# Patient Record
Sex: FEMALE | Race: AMERICAN INDIAN OR ALASKA NATIVE | NOT HISPANIC OR LATINO | URBAN - METROPOLITAN AREA
[De-identification: names, ages, dates, MRNs, and addresses within clinical notes are randomized per-mention and may not be internally consistent; named-entity substitution may affect disease eponyms.]

---

## 2017-01-17 ENCOUNTER — EMERGENCY (EMERGENCY)
Facility: HOSPITAL | Age: 52
LOS: 0 days | Discharge: HOME | End: 2017-01-17

## 2017-06-27 DIAGNOSIS — Z02.9 ENCOUNTER FOR ADMINISTRATIVE EXAMINATIONS, UNSPECIFIED: ICD-10-CM

## 2021-08-11 ENCOUNTER — INPATIENT (INPATIENT)
Facility: HOSPITAL | Age: 56
LOS: 2 days | Discharge: HOME | End: 2021-08-14
Attending: SURGERY | Admitting: SURGERY
Payer: MEDICAID

## 2021-08-11 VITALS
RESPIRATION RATE: 19 BRPM | DIASTOLIC BLOOD PRESSURE: 82 MMHG | HEART RATE: 89 BPM | OXYGEN SATURATION: 98 % | SYSTOLIC BLOOD PRESSURE: 131 MMHG | WEIGHT: 115.08 LBS | TEMPERATURE: 100 F

## 2021-08-11 LAB
ALBUMIN SERPL ELPH-MCNC: 4.3 G/DL — SIGNIFICANT CHANGE UP (ref 3.5–5.2)
ALP SERPL-CCNC: 114 U/L — SIGNIFICANT CHANGE UP (ref 30–115)
ALT FLD-CCNC: 18 U/L — SIGNIFICANT CHANGE UP (ref 0–41)
ANION GAP SERPL CALC-SCNC: 12 MMOL/L — SIGNIFICANT CHANGE UP (ref 7–14)
AST SERPL-CCNC: 19 U/L — SIGNIFICANT CHANGE UP (ref 0–41)
BASOPHILS # BLD AUTO: 0.05 K/UL — SIGNIFICANT CHANGE UP (ref 0–0.2)
BASOPHILS NFR BLD AUTO: 0.4 % — SIGNIFICANT CHANGE UP (ref 0–1)
BILIRUB SERPL-MCNC: 0.6 MG/DL — SIGNIFICANT CHANGE UP (ref 0.2–1.2)
BUN SERPL-MCNC: 11 MG/DL — SIGNIFICANT CHANGE UP (ref 10–20)
CALCIUM SERPL-MCNC: 9.5 MG/DL — SIGNIFICANT CHANGE UP (ref 8.5–10.1)
CHLORIDE SERPL-SCNC: 103 MMOL/L — SIGNIFICANT CHANGE UP (ref 98–110)
CO2 SERPL-SCNC: 26 MMOL/L — SIGNIFICANT CHANGE UP (ref 17–32)
CREAT SERPL-MCNC: 0.7 MG/DL — SIGNIFICANT CHANGE UP (ref 0.7–1.5)
EOSINOPHIL # BLD AUTO: 0.14 K/UL — SIGNIFICANT CHANGE UP (ref 0–0.7)
EOSINOPHIL NFR BLD AUTO: 1.2 % — SIGNIFICANT CHANGE UP (ref 0–8)
GLUCOSE SERPL-MCNC: 99 MG/DL — SIGNIFICANT CHANGE UP (ref 70–99)
HCT VFR BLD CALC: 40.3 % — SIGNIFICANT CHANGE UP (ref 37–47)
HGB BLD-MCNC: 13.2 G/DL — SIGNIFICANT CHANGE UP (ref 12–16)
IMM GRANULOCYTES NFR BLD AUTO: 0.4 % — HIGH (ref 0.1–0.3)
LIDOCAIN IGE QN: 22 U/L — SIGNIFICANT CHANGE UP (ref 7–60)
LYMPHOCYTES # BLD AUTO: 1.57 K/UL — SIGNIFICANT CHANGE UP (ref 1.2–3.4)
LYMPHOCYTES # BLD AUTO: 13.8 % — LOW (ref 20.5–51.1)
MCHC RBC-ENTMCNC: 28.3 PG — SIGNIFICANT CHANGE UP (ref 27–31)
MCHC RBC-ENTMCNC: 32.8 G/DL — SIGNIFICANT CHANGE UP (ref 32–37)
MCV RBC AUTO: 86.5 FL — SIGNIFICANT CHANGE UP (ref 81–99)
MONOCYTES # BLD AUTO: 0.97 K/UL — HIGH (ref 0.1–0.6)
MONOCYTES NFR BLD AUTO: 8.5 % — SIGNIFICANT CHANGE UP (ref 1.7–9.3)
NEUTROPHILS # BLD AUTO: 8.62 K/UL — HIGH (ref 1.4–6.5)
NEUTROPHILS NFR BLD AUTO: 75.7 % — HIGH (ref 42.2–75.2)
NRBC # BLD: 0 /100 WBCS — SIGNIFICANT CHANGE UP (ref 0–0)
PLATELET # BLD AUTO: 313 K/UL — SIGNIFICANT CHANGE UP (ref 130–400)
POTASSIUM SERPL-MCNC: 4.5 MMOL/L — SIGNIFICANT CHANGE UP (ref 3.5–5)
POTASSIUM SERPL-SCNC: 4.5 MMOL/L — SIGNIFICANT CHANGE UP (ref 3.5–5)
PROT SERPL-MCNC: 6.8 G/DL — SIGNIFICANT CHANGE UP (ref 6–8)
RBC # BLD: 4.66 M/UL — SIGNIFICANT CHANGE UP (ref 4.2–5.4)
RBC # FLD: 12.2 % — SIGNIFICANT CHANGE UP (ref 11.5–14.5)
SARS-COV-2 RNA SPEC QL NAA+PROBE: SIGNIFICANT CHANGE UP
SODIUM SERPL-SCNC: 141 MMOL/L — SIGNIFICANT CHANGE UP (ref 135–146)
TROPONIN T SERPL-MCNC: <0.01 NG/ML — SIGNIFICANT CHANGE UP
WBC # BLD: 11.39 K/UL — HIGH (ref 4.8–10.8)
WBC # FLD AUTO: 11.39 K/UL — HIGH (ref 4.8–10.8)

## 2021-08-11 PROCEDURE — 74177 CT ABD & PELVIS W/CONTRAST: CPT | Mod: 26,MA

## 2021-08-11 PROCEDURE — 99285 EMERGENCY DEPT VISIT HI MDM: CPT

## 2021-08-11 PROCEDURE — 99284 EMERGENCY DEPT VISIT MOD MDM: CPT

## 2021-08-11 PROCEDURE — 71045 X-RAY EXAM CHEST 1 VIEW: CPT | Mod: 26

## 2021-08-11 PROCEDURE — 93010 ELECTROCARDIOGRAM REPORT: CPT

## 2021-08-11 RX ORDER — CEFOTETAN DISODIUM 1 G
1000 VIAL (EA) INJECTION ONCE
Refills: 0 | Status: DISCONTINUED | OUTPATIENT
Start: 2021-08-11 | End: 2021-08-12

## 2021-08-11 RX ORDER — ONDANSETRON 8 MG/1
4 TABLET, FILM COATED ORAL ONCE
Refills: 0 | Status: COMPLETED | OUTPATIENT
Start: 2021-08-11 | End: 2021-08-11

## 2021-08-11 RX ORDER — FAMOTIDINE 10 MG/ML
20 INJECTION INTRAVENOUS ONCE
Refills: 0 | Status: COMPLETED | OUTPATIENT
Start: 2021-08-11 | End: 2021-08-11

## 2021-08-11 RX ORDER — SODIUM CHLORIDE 9 MG/ML
1000 INJECTION INTRAMUSCULAR; INTRAVENOUS; SUBCUTANEOUS ONCE
Refills: 0 | Status: COMPLETED | OUTPATIENT
Start: 2021-08-11 | End: 2021-08-11

## 2021-08-11 RX ORDER — MORPHINE SULFATE 50 MG/1
4 CAPSULE, EXTENDED RELEASE ORAL ONCE
Refills: 0 | Status: DISCONTINUED | OUTPATIENT
Start: 2021-08-11 | End: 2021-08-11

## 2021-08-11 RX ADMIN — ONDANSETRON 104 MILLIGRAM(S): 8 TABLET, FILM COATED ORAL at 20:49

## 2021-08-11 RX ADMIN — FAMOTIDINE 20 MILLIGRAM(S): 10 INJECTION INTRAVENOUS at 22:00

## 2021-08-11 RX ADMIN — MORPHINE SULFATE 4 MILLIGRAM(S): 50 CAPSULE, EXTENDED RELEASE ORAL at 20:50

## 2021-08-11 RX ADMIN — FAMOTIDINE 100 MILLIGRAM(S): 10 INJECTION INTRAVENOUS at 20:50

## 2021-08-11 RX ADMIN — SODIUM CHLORIDE 1000 MILLILITER(S): 9 INJECTION INTRAMUSCULAR; INTRAVENOUS; SUBCUTANEOUS at 20:50

## 2021-08-11 NOTE — ED PROVIDER NOTE - PHYSICAL EXAMINATION
--EXAM--  VITAL SIGNS: I have reviewed vs documented at present.  CONSTITUTIONAL: Well-developed; well-nourished; in no acute distress.   SKIN: Warm and dry, no acute rash.     NECK: Supple; non tender.  CARD: S1, S2, Regular rate and rhythm.   RESP: No wheezes, rales or rhonchi.  ABD: Normal bowel sounds; soft; non-distended; tender epigastric   EXT: Normal ROM.   NEURO: Alert, oriented, grossly unremarkable. Strength 5/5 in all extremities. Sensation intact throughout.  PSYCH: Cooperative, appropriate.

## 2021-08-11 NOTE — H&P ADULT - NSHPPHYSICALEXAM_GEN_ALL_CORE
Vital Signs Last 24 Hrs  T(C): 37.2 (11 Aug 2021 20:52), Max: 37.5 (11 Aug 2021 19:46)  T(F): 99 (11 Aug 2021 20:52), Max: 99.5 (11 Aug 2021 19:46)  HR: 89 (11 Aug 2021 19:46) (89 - 89)  BP: 131/82 (11 Aug 2021 19:46) (131/82 - 131/82)  BP(mean): --  RR: 19 (11 Aug 2021 19:46) (19 - 19)  SpO2: 98% (11 Aug 2021 19:46) (98% - 98%)      PHYSICAL EXAM:      Constitutional: NAD, A&O x3    Eyes: PERRLA, no conjuctivitis    Neck: no lymphadenopathy    Respiratory: +air entry, no rales, no rhonchi, no wheezes    Cardiovascular: +S1 and S2, regular rate and rhythm    Gastrointestinal: +BS, soft, (+) TTP RLQ>LLQ, no reboud, not distended    Extremities:  no edema, no calf tenderness    Neurological: sensation intact, ROM equal B/L, CN II-XII intact    Skin: no rashes, normal turgor

## 2021-08-11 NOTE — H&P ADULT - HISTORY OF PRESENT ILLNESS
54 y/o female with PMH of HTN, HLD, GERD presents to the ED with c/o 3 day hx abdominal pain associated with nausea/vomiting and chills. Pt states, pain started at the epigastrium but then radiated to the lower abdomen right>left. Pt sister at bedside states, pt also c/o chest pressure when pt was nauseous. Denies SOB, cough and palpitations.

## 2021-08-11 NOTE — H&P ADULT - ATTENDING COMMENTS
This is a 56yo woman with a history of HLD, HTN, and chronic back pain due to herniated discs. She began to experience vague abdominal pain 4d PTA. Pain was diffuse, but mostly in the L upper abdomen. It was initially a/w nausea and some emesis; she denies fevers, chills. There was no change in bowel habits. She denies dysuria or a history of gynecologic problems. She took tylenol which did not help much with this pain. Pain did migrate to the bilateral lower quadrants over the past 24h; she does state R>L. The pt has not had such pain before, though she does have a history of back pain as mentioned previously. In the ER, she was afebrile and HD stable. The abdomen is mildly tender, without peritoneal findings. There is tenderness in all four quadrants. Rovsing sign is negative. Labs show initial WBC ct 11.39 which normalized as of this morning with IV abx. CT was done and shows no bowel pathology; there is air within the lumen of the proximal and midappendix, and perhaps very mild thickening of the distal appendix where air is absent. There is no fecalith. There is minimal to no stranding of the mesenteric fat surrounding the appendix.    As of this morning, pt states she feels somewhat better than last night - though pain to some degree persists. Exam is as of the above.    We will plan for inpatient admission; serial exams; IV abx; keep NPO for now. Nevertheless, the history is not supportive of acute appendicitis (distribution, pattern of pain, age category) and CT findings do appear somewhat ambiguous to me - I would expect more inflammatory changes after 4d of such symptoms. For this reason, I would not advise surgery at the moment. We will monitor the patient, and I have advised her that most patients in the absence of a fecalith, even if appendicitis is present, will improve with antibiotic therapy alone and that 75% of such patients will have no appendiceal problems in the future according to current surgical literature. I have also let her and her family know that if she fails to improve, the risks, benefits, and alternatives to lap appendectomy will be something to discuss. She understands the above. This is a 54yo woman with a history of HLD, HTN, and chronic back pain due to herniated discs. She began to experience vague abdominal pain 4d PTA. Pain was diffuse, but mostly in the L upper abdomen. It was initially a/w nausea and some emesis; she denies fevers, chills. There was no change in bowel habits. She denies dysuria or a history of gynecologic problems. She took tylenol which did not help much with this pain. Pain did migrate to the bilateral lower quadrants over the past 24h; she does state R>L. The pt has not had such pain before, though she does have a history of back pain as mentioned previously. In the ER, she was afebrile and HD stable. The abdomen is mildly tender, without peritoneal findings. There is tenderness in all four quadrants. Rovsing sign is negative. Labs show initial WBC ct 11.39 which normalized as of this morning with IV abx. CT was done and shows no bowel pathology; there is air within the lumen of the proximal and midappendix, and perhaps very mild thickening of the distal appendix where air is absent. There is no fecalith. There is minimal to no stranding of the mesenteric fat surrounding the appendix.    As of this morning, pt states she feels somewhat better than last night - though pain to some degree persists. Exam is as of the above.    The history is not supportive of acute appendicitis (distribution, pattern of pain, age category) and CT findings do appear somewhat ambiguous to me - I would expect more inflammatory changes in the right lower quadrant after 4d of symptoms in the setting of true appendicitis. For this reason, I would not advise surgery at the moment. We will monitor the patient, and I have advised her that most patients in the absence of a fecalith, even if appendicitis is present, will improve with antibiotic therapy alone and that 75% of such patients will have no appendiceal problems in the future according to current surgical literature. We will plan for inpatient admission; serial exams; IV abx; keep NPO. I have let the patient and her family know that if she fails to improve, the risks, benefits, and alternatives to lap appendectomy will be something to discuss given that there is no other pathology identified on CT. She understands the above.

## 2021-08-11 NOTE — ED PROVIDER NOTE - PROGRESS NOTE DETAILS
spoke to radiologist  he is reading tip appendicitis spoke to surgical pa will see patient pt reexamined -  ttp RLQ,  LLQ  ( as well as  LUQ and epigastrum - no rebound guarding or rigidity )

## 2021-08-11 NOTE — H&P ADULT - ASSESSMENT
56 y/o female with PMH of HTN, HLD, GERD admitted for appendicitis.     A/P:  Discussed with Dr. Garcia   -NPO  -Pain mgmt  -IV hydration  -AM labs  HTN  -On losartan faily controlled   HLD   - ON simvastatin   -GI and DVT prophylaxis

## 2021-08-11 NOTE — ED PROVIDER NOTE - NS ED ROS FT
Review of Systems:  	•	CONSTITUTIONAL - no fever, no diaphoresis, no chills  	•	SKIN - no rash  	•	HEMATOLOGIC - no bleeding, no bruising  	•	EYES - no eye pain, no blurry vision  	•	ENT - no change in hearing, no sore throat, no ear pain or tinnitus  	•	RESPIRATORY - no shortness of breath, no cough  	•	CARDIAC - no chest pain, no palpitations  	•	GI - abd pain, no nausea, no vomiting, no diarrhea, no constipation  	•	GENITO-URINARY - no discharge, no dysuria; no hematuria, no increased urinary frequency  	•	MUSCULOSKELETAL - no joint paint, no swelling, no redness

## 2021-08-11 NOTE — ED PROVIDER NOTE - CLINICAL SUMMARY MEDICAL DECISION MAKING FREE TEXT BOX
55yF pmhx  htn  joint pain on gabapentin pw 4 days  upper  abdominal pain  2 days of nausea  felt weak  today . on exam  pt  generalized ttp - labs reviewed wnl    CT with tip appendicitis -   abx given  dw surgery - admitted

## 2021-08-11 NOTE — ED PROVIDER NOTE - OBJECTIVE STATEMENT
this is 56 yo female presents to ed for evaluation of abdominal pain. patient states pain has been there for few days but today it is worse

## 2021-08-12 DIAGNOSIS — K35.80 UNSPECIFIED ACUTE APPENDICITIS: ICD-10-CM

## 2021-08-12 LAB
ANION GAP SERPL CALC-SCNC: 9 MMOL/L — SIGNIFICANT CHANGE UP (ref 7–14)
APTT BLD: 33.9 SEC — SIGNIFICANT CHANGE UP (ref 27–39.2)
BUN SERPL-MCNC: 8 MG/DL — LOW (ref 10–20)
CALCIUM SERPL-MCNC: 8.8 MG/DL — SIGNIFICANT CHANGE UP (ref 8.5–10.1)
CHLORIDE SERPL-SCNC: 108 MMOL/L — SIGNIFICANT CHANGE UP (ref 98–110)
CO2 SERPL-SCNC: 24 MMOL/L — SIGNIFICANT CHANGE UP (ref 17–32)
CREAT SERPL-MCNC: 0.6 MG/DL — LOW (ref 0.7–1.5)
GLUCOSE SERPL-MCNC: 101 MG/DL — HIGH (ref 70–99)
HCT VFR BLD CALC: 40 % — SIGNIFICANT CHANGE UP (ref 37–47)
HCV AB S/CO SERPL IA: 0.03 COI — SIGNIFICANT CHANGE UP
HCV AB SERPL-IMP: SIGNIFICANT CHANGE UP
HGB BLD-MCNC: 12.6 G/DL — SIGNIFICANT CHANGE UP (ref 12–16)
INR BLD: 1 RATIO — SIGNIFICANT CHANGE UP (ref 0.65–1.3)
MAGNESIUM SERPL-MCNC: 2.2 MG/DL — SIGNIFICANT CHANGE UP (ref 1.8–2.4)
MCHC RBC-ENTMCNC: 28.4 PG — SIGNIFICANT CHANGE UP (ref 27–31)
MCHC RBC-ENTMCNC: 31.5 G/DL — LOW (ref 32–37)
MCV RBC AUTO: 90.1 FL — SIGNIFICANT CHANGE UP (ref 81–99)
NRBC # BLD: 0 /100 WBCS — SIGNIFICANT CHANGE UP (ref 0–0)
PHOSPHATE SERPL-MCNC: 3.5 MG/DL — SIGNIFICANT CHANGE UP (ref 2.1–4.9)
PLATELET # BLD AUTO: 264 K/UL — SIGNIFICANT CHANGE UP (ref 130–400)
POTASSIUM SERPL-MCNC: 4.4 MMOL/L — SIGNIFICANT CHANGE UP (ref 3.5–5)
POTASSIUM SERPL-SCNC: 4.4 MMOL/L — SIGNIFICANT CHANGE UP (ref 3.5–5)
PROTHROM AB SERPL-ACNC: 11.5 SEC — SIGNIFICANT CHANGE UP (ref 9.95–12.87)
RBC # BLD: 4.44 M/UL — SIGNIFICANT CHANGE UP (ref 4.2–5.4)
RBC # FLD: 12.4 % — SIGNIFICANT CHANGE UP (ref 11.5–14.5)
SODIUM SERPL-SCNC: 141 MMOL/L — SIGNIFICANT CHANGE UP (ref 135–146)
WBC # BLD: 5.63 K/UL — SIGNIFICANT CHANGE UP (ref 4.8–10.8)
WBC # FLD AUTO: 5.63 K/UL — SIGNIFICANT CHANGE UP (ref 4.8–10.8)

## 2021-08-12 RX ORDER — MORPHINE SULFATE 50 MG/1
2 CAPSULE, EXTENDED RELEASE ORAL ONCE
Refills: 0 | Status: DISCONTINUED | OUTPATIENT
Start: 2021-08-12 | End: 2021-08-12

## 2021-08-12 RX ORDER — SIMVASTATIN 20 MG/1
20 TABLET, FILM COATED ORAL AT BEDTIME
Refills: 0 | Status: DISCONTINUED | OUTPATIENT
Start: 2021-08-12 | End: 2021-08-14

## 2021-08-12 RX ORDER — MORPHINE SULFATE 50 MG/1
2 CAPSULE, EXTENDED RELEASE ORAL EVERY 4 HOURS
Refills: 0 | Status: DISCONTINUED | OUTPATIENT
Start: 2021-08-12 | End: 2021-08-12

## 2021-08-12 RX ORDER — FAMOTIDINE 10 MG/ML
20 INJECTION INTRAVENOUS
Refills: 0 | Status: DISCONTINUED | OUTPATIENT
Start: 2021-08-12 | End: 2021-08-14

## 2021-08-12 RX ORDER — CEFOTETAN DISODIUM 1 G
1 VIAL (EA) INJECTION ONCE
Refills: 0 | Status: COMPLETED | OUTPATIENT
Start: 2021-08-12 | End: 2021-08-12

## 2021-08-12 RX ORDER — ONDANSETRON 8 MG/1
4 TABLET, FILM COATED ORAL EVERY 6 HOURS
Refills: 0 | Status: DISCONTINUED | OUTPATIENT
Start: 2021-08-12 | End: 2021-08-14

## 2021-08-12 RX ORDER — HEPARIN SODIUM 5000 [USP'U]/ML
5000 INJECTION INTRAVENOUS; SUBCUTANEOUS EVERY 12 HOURS
Refills: 0 | Status: DISCONTINUED | OUTPATIENT
Start: 2021-08-12 | End: 2021-08-14

## 2021-08-12 RX ORDER — METRONIDAZOLE 500 MG
500 TABLET ORAL EVERY 8 HOURS
Refills: 0 | Status: DISCONTINUED | OUTPATIENT
Start: 2021-08-12 | End: 2021-08-14

## 2021-08-12 RX ORDER — LOSARTAN POTASSIUM 100 MG/1
50 TABLET, FILM COATED ORAL DAILY
Refills: 0 | Status: DISCONTINUED | OUTPATIENT
Start: 2021-08-12 | End: 2021-08-14

## 2021-08-12 RX ORDER — SODIUM CHLORIDE 9 MG/ML
1000 INJECTION INTRAMUSCULAR; INTRAVENOUS; SUBCUTANEOUS
Refills: 0 | Status: DISCONTINUED | OUTPATIENT
Start: 2021-08-12 | End: 2021-08-13

## 2021-08-12 RX ORDER — CHLORHEXIDINE GLUCONATE 213 G/1000ML
1 SOLUTION TOPICAL
Refills: 0 | Status: DISCONTINUED | OUTPATIENT
Start: 2021-08-12 | End: 2021-08-14

## 2021-08-12 RX ORDER — CEFOTETAN DISODIUM 1 G
1 VIAL (EA) INJECTION EVERY 12 HOURS
Refills: 0 | Status: DISCONTINUED | OUTPATIENT
Start: 2021-08-12 | End: 2021-08-14

## 2021-08-12 RX ADMIN — MORPHINE SULFATE 2 MILLIGRAM(S): 50 CAPSULE, EXTENDED RELEASE ORAL at 23:59

## 2021-08-12 RX ADMIN — ONDANSETRON 4 MILLIGRAM(S): 8 TABLET, FILM COATED ORAL at 02:00

## 2021-08-12 RX ADMIN — HEPARIN SODIUM 5000 UNIT(S): 5000 INJECTION INTRAVENOUS; SUBCUTANEOUS at 18:41

## 2021-08-12 RX ADMIN — Medication 100 MILLIGRAM(S): at 22:09

## 2021-08-12 RX ADMIN — Medication 1 GRAM(S): at 00:00

## 2021-08-12 RX ADMIN — MORPHINE SULFATE 2 MILLIGRAM(S): 50 CAPSULE, EXTENDED RELEASE ORAL at 18:57

## 2021-08-12 RX ADMIN — Medication 100 MILLIGRAM(S): at 14:32

## 2021-08-12 RX ADMIN — FAMOTIDINE 20 MILLIGRAM(S): 10 INJECTION INTRAVENOUS at 18:37

## 2021-08-12 RX ADMIN — Medication 100 GRAM(S): at 00:27

## 2021-08-12 RX ADMIN — SODIUM CHLORIDE 1000 MILLILITER(S): 9 INJECTION INTRAMUSCULAR; INTRAVENOUS; SUBCUTANEOUS at 02:00

## 2021-08-12 RX ADMIN — Medication 100 GRAM(S): at 18:35

## 2021-08-12 RX ADMIN — SODIUM CHLORIDE 100 MILLILITER(S): 9 INJECTION INTRAMUSCULAR; INTRAVENOUS; SUBCUTANEOUS at 23:59

## 2021-08-12 RX ADMIN — SIMVASTATIN 20 MILLIGRAM(S): 20 TABLET, FILM COATED ORAL at 23:56

## 2021-08-12 RX ADMIN — MORPHINE SULFATE 4 MILLIGRAM(S): 50 CAPSULE, EXTENDED RELEASE ORAL at 03:21

## 2021-08-12 RX ADMIN — FAMOTIDINE 20 MILLIGRAM(S): 10 INJECTION INTRAVENOUS at 06:14

## 2021-08-12 RX ADMIN — HEPARIN SODIUM 5000 UNIT(S): 5000 INJECTION INTRAVENOUS; SUBCUTANEOUS at 06:14

## 2021-08-12 RX ADMIN — SODIUM CHLORIDE 100 MILLILITER(S): 9 INJECTION INTRAMUSCULAR; INTRAVENOUS; SUBCUTANEOUS at 15:00

## 2021-08-13 LAB
ANION GAP SERPL CALC-SCNC: 13 MMOL/L — SIGNIFICANT CHANGE UP (ref 7–14)
BLD GP AB SCN SERPL QL: SIGNIFICANT CHANGE UP
BUN SERPL-MCNC: 9 MG/DL — LOW (ref 10–20)
CALCIUM SERPL-MCNC: 9.1 MG/DL — SIGNIFICANT CHANGE UP (ref 8.5–10.1)
CHLORIDE SERPL-SCNC: 104 MMOL/L — SIGNIFICANT CHANGE UP (ref 98–110)
CO2 SERPL-SCNC: 25 MMOL/L — SIGNIFICANT CHANGE UP (ref 17–32)
CREAT SERPL-MCNC: 0.7 MG/DL — SIGNIFICANT CHANGE UP (ref 0.7–1.5)
GLUCOSE SERPL-MCNC: 75 MG/DL — SIGNIFICANT CHANGE UP (ref 70–99)
HCT VFR BLD CALC: 39.7 % — SIGNIFICANT CHANGE UP (ref 37–47)
HGB BLD-MCNC: 12.9 G/DL — SIGNIFICANT CHANGE UP (ref 12–16)
MCHC RBC-ENTMCNC: 28.5 PG — SIGNIFICANT CHANGE UP (ref 27–31)
MCHC RBC-ENTMCNC: 32.5 G/DL — SIGNIFICANT CHANGE UP (ref 32–37)
MCV RBC AUTO: 87.6 FL — SIGNIFICANT CHANGE UP (ref 81–99)
NRBC # BLD: 0 /100 WBCS — SIGNIFICANT CHANGE UP (ref 0–0)
PLATELET # BLD AUTO: 303 K/UL — SIGNIFICANT CHANGE UP (ref 130–400)
POTASSIUM SERPL-MCNC: 4.2 MMOL/L — SIGNIFICANT CHANGE UP (ref 3.5–5)
POTASSIUM SERPL-SCNC: 4.2 MMOL/L — SIGNIFICANT CHANGE UP (ref 3.5–5)
RBC # BLD: 4.53 M/UL — SIGNIFICANT CHANGE UP (ref 4.2–5.4)
RBC # FLD: 12 % — SIGNIFICANT CHANGE UP (ref 11.5–14.5)
SODIUM SERPL-SCNC: 142 MMOL/L — SIGNIFICANT CHANGE UP (ref 135–146)
WBC # BLD: 4.77 K/UL — LOW (ref 4.8–10.8)
WBC # FLD AUTO: 4.77 K/UL — LOW (ref 4.8–10.8)

## 2021-08-13 PROCEDURE — 99233 SBSQ HOSP IP/OBS HIGH 50: CPT

## 2021-08-13 RX ADMIN — LOSARTAN POTASSIUM 50 MILLIGRAM(S): 100 TABLET, FILM COATED ORAL at 05:50

## 2021-08-13 RX ADMIN — CHLORHEXIDINE GLUCONATE 1 APPLICATION(S): 213 SOLUTION TOPICAL at 05:50

## 2021-08-13 RX ADMIN — Medication 100 GRAM(S): at 17:32

## 2021-08-13 RX ADMIN — HEPARIN SODIUM 5000 UNIT(S): 5000 INJECTION INTRAVENOUS; SUBCUTANEOUS at 05:53

## 2021-08-13 RX ADMIN — Medication 100 GRAM(S): at 05:50

## 2021-08-13 RX ADMIN — Medication 100 MILLIGRAM(S): at 05:50

## 2021-08-13 RX ADMIN — Medication 100 MILLIGRAM(S): at 13:19

## 2021-08-13 RX ADMIN — FAMOTIDINE 20 MILLIGRAM(S): 10 INJECTION INTRAVENOUS at 17:31

## 2021-08-13 RX ADMIN — FAMOTIDINE 20 MILLIGRAM(S): 10 INJECTION INTRAVENOUS at 05:50

## 2021-08-13 RX ADMIN — SIMVASTATIN 20 MILLIGRAM(S): 20 TABLET, FILM COATED ORAL at 22:12

## 2021-08-13 RX ADMIN — Medication 100 MILLIGRAM(S): at 22:12

## 2021-08-13 NOTE — PROGRESS NOTE ADULT - ASSESSMENT
56 y/o female with PMH of HTN, HLD, GERD admitted for tip appendicitis on CT  conservative management     A/P:    -start full liq diet , if christiano then can give reg diet for dinner   -Pain mgmt  -IV hydration  -AM labs  -cw losartan faily controlled   -OOB   -poss d/c 8/14

## 2021-08-13 NOTE — PROGRESS NOTE ADULT - ATTENDING COMMENTS
The patient continues to improve. +appetite and minimal abdominal pain, 4/10 today and 6/10 yest. SHe has requried no pain medication since yest afternoon. She denies nausea or emesis and would like to try diet. She is requesting when she might go home.    Exam with minimal ttp and no peritoneal findings, in b/l lower quadrants.  WBC ct normal    A/P: This patient presented with abdominal pain and a hsitory that was atypical for acute appendicitis; with possible 'tip' appendicitis and no fecalith on admiission CT scan. She is responding to antibiotic therapy.    Plan to advance to [facility]; cont abx; cont serial abdomiinal exams. Possible discharge to home tomorrow.

## 2021-08-14 ENCOUNTER — TRANSCRIPTION ENCOUNTER (OUTPATIENT)
Age: 56
End: 2021-08-14

## 2021-08-14 VITALS
SYSTOLIC BLOOD PRESSURE: 119 MMHG | RESPIRATION RATE: 17 BRPM | DIASTOLIC BLOOD PRESSURE: 67 MMHG | TEMPERATURE: 98 F | HEART RATE: 70 BPM

## 2021-08-14 LAB
ANION GAP SERPL CALC-SCNC: 11 MMOL/L — SIGNIFICANT CHANGE UP (ref 7–14)
BUN SERPL-MCNC: 8 MG/DL — LOW (ref 10–20)
CALCIUM SERPL-MCNC: 9.6 MG/DL — SIGNIFICANT CHANGE UP (ref 8.5–10.1)
CHLORIDE SERPL-SCNC: 105 MMOL/L — SIGNIFICANT CHANGE UP (ref 98–110)
CO2 SERPL-SCNC: 28 MMOL/L — SIGNIFICANT CHANGE UP (ref 17–32)
COVID-19 SPIKE DOMAIN AB INTERP: POSITIVE
COVID-19 SPIKE DOMAIN ANTIBODY RESULT: >250 U/ML — HIGH
CREAT SERPL-MCNC: 0.8 MG/DL — SIGNIFICANT CHANGE UP (ref 0.7–1.5)
GLUCOSE SERPL-MCNC: 89 MG/DL — SIGNIFICANT CHANGE UP (ref 70–99)
HCT VFR BLD CALC: 42.3 % — SIGNIFICANT CHANGE UP (ref 37–47)
HGB BLD-MCNC: 13.9 G/DL — SIGNIFICANT CHANGE UP (ref 12–16)
MCHC RBC-ENTMCNC: 28.1 PG — SIGNIFICANT CHANGE UP (ref 27–31)
MCHC RBC-ENTMCNC: 32.9 G/DL — SIGNIFICANT CHANGE UP (ref 32–37)
MCV RBC AUTO: 85.6 FL — SIGNIFICANT CHANGE UP (ref 81–99)
NRBC # BLD: 0 /100 WBCS — SIGNIFICANT CHANGE UP (ref 0–0)
PLATELET # BLD AUTO: 384 K/UL — SIGNIFICANT CHANGE UP (ref 130–400)
POTASSIUM SERPL-MCNC: 4 MMOL/L — SIGNIFICANT CHANGE UP (ref 3.5–5)
POTASSIUM SERPL-SCNC: 4 MMOL/L — SIGNIFICANT CHANGE UP (ref 3.5–5)
RBC # BLD: 4.94 M/UL — SIGNIFICANT CHANGE UP (ref 4.2–5.4)
RBC # FLD: 11.9 % — SIGNIFICANT CHANGE UP (ref 11.5–14.5)
SARS-COV-2 IGG+IGM SERPL QL IA: >250 U/ML — HIGH
SARS-COV-2 IGG+IGM SERPL QL IA: POSITIVE
SODIUM SERPL-SCNC: 144 MMOL/L — SIGNIFICANT CHANGE UP (ref 135–146)
WBC # BLD: 4.05 K/UL — LOW (ref 4.8–10.8)
WBC # FLD AUTO: 4.05 K/UL — LOW (ref 4.8–10.8)

## 2021-08-14 PROCEDURE — 99232 SBSQ HOSP IP/OBS MODERATE 35: CPT

## 2021-08-14 RX ORDER — LOSARTAN POTASSIUM 100 MG/1
1 TABLET, FILM COATED ORAL
Qty: 0 | Refills: 0 | DISCHARGE

## 2021-08-14 RX ORDER — SIMVASTATIN 20 MG/1
1 TABLET, FILM COATED ORAL
Qty: 0 | Refills: 0 | DISCHARGE

## 2021-08-14 RX ADMIN — FAMOTIDINE 20 MILLIGRAM(S): 10 INJECTION INTRAVENOUS at 05:31

## 2021-08-14 RX ADMIN — HEPARIN SODIUM 5000 UNIT(S): 5000 INJECTION INTRAVENOUS; SUBCUTANEOUS at 05:31

## 2021-08-14 RX ADMIN — Medication 100 GRAM(S): at 05:31

## 2021-08-14 RX ADMIN — Medication 100 MILLIGRAM(S): at 05:31

## 2021-08-14 RX ADMIN — CHLORHEXIDINE GLUCONATE 1 APPLICATION(S): 213 SOLUTION TOPICAL at 05:31

## 2021-08-14 RX ADMIN — LOSARTAN POTASSIUM 50 MILLIGRAM(S): 100 TABLET, FILM COATED ORAL at 05:32

## 2021-08-14 NOTE — DISCHARGE NOTE PROVIDER - NSDCCPCAREPLAN_GEN_ALL_CORE_FT
PRINCIPAL DISCHARGE DIAGNOSIS  Diagnosis: Acute appendicitis  Assessment and Plan of Treatment:        PRINCIPAL DISCHARGE DIAGNOSIS  Diagnosis: Acute appendicitis  Assessment and Plan of Treatment: Prescription sent: Augmentin 875/125 once tab by mouth twice daily for 7 days.  Follow up with Dr. Bush Friday 8/20/21

## 2021-08-14 NOTE — DISCHARGE NOTE PROVIDER - CARE PROVIDER_API CALL
Shiv Bush  [address] [place] [address]  Phone: [phone]  Fax: (   )    -  Scheduled Appointment: 08/20/2021

## 2021-08-14 NOTE — DISCHARGE NOTE NURSING/CASE MANAGEMENT/SOCIAL WORK - PATIENT PORTAL LINK FT
You can access the FollowMyHealth Patient Portal offered by [facility] by registering at the following website: [email or web address]. By joining [employer]’s FollowMyHealth portal, you will also be able to view your health information using other applications (apps) compatible with our system.

## 2021-08-14 NOTE — DISCHARGE NOTE PROVIDER - PROVIDER TOKENS
FREE:[LAST:[Eleazar],FIRST:[Shiv],PHONE:[phone]],FAX:[phone]   [phone]    -],ADDRESS:[[address] [place] [address]],SCHEDULEDAPPT:[08/20/2021]]

## 2021-08-14 NOTE — PROGRESS NOTE ADULT - ATTENDING COMMENTS
Pt has improved and cont to do well. Toleratig diet with no residual pain or tenderness; afebrile. Plan for dc to home with 7d course PO abx and followup this Friday in office. Return precautions (fever, new or worsening abdominal pain, nausea, emesis) were provided.

## 2021-08-14 NOTE — DISCHARGE NOTE PROVIDER - NSDCMRMEDTOKEN_GEN_ALL_CORE_FT
losartan 50 mg oral tablet: 1 tab(s) orally once a day  Zocor 20 mg oral tablet: 1 tab(s) orally once a day (at bedtime)   Augmentin 875 mg-125 mg oral tablet: 1 tab(s) orally 2 times a day   losartan 50 mg oral tablet: 1 tab(s) orally once a day  Zocor 20 mg oral tablet: 1 tab(s) orally once a day (at bedtime)

## 2021-08-14 NOTE — PROGRESS NOTE ADULT - SUBJECTIVE AND OBJECTIVE BOX
Hospital day # 1    Pt seen and examined , feeling better   abd pain improved with no nausea or vomiting   pt wants to eat   denies sob/ cp / dysuria / chills / fever    PAST MEDICAL & SURGICAL HISTORY:  HTN,   hld  CBP    MEDICATIONS  (STANDING):  cefoTEtan  IVPB 1 Gram(s) IV Intermittent every 12 hours  chlorhexidine 4% Liquid 1 Application(s) Topical two times a day  famotidine Injectable 20 milliGRAM(s) IV Push two times a day  heparin   Injectable 5000 Unit(s) SubCutaneous every 12 hours  losartan 50 milliGRAM(s) Oral daily  metroNIDAZOLE  IVPB 500 milliGRAM(s) IV Intermittent every 8 hours  simvastatin 20 milliGRAM(s) Oral at bedtime      Vital Signs Last 24 Hrs  T(C): 35.9 (13 Aug 2021 14:00), Max: 36.5 (13 Aug 2021 05:15)  T(F): 96.6 (13 Aug 2021 14:00), Max: 97.7 (13 Aug 2021 05:15)  HR: 74 (13 Aug 2021 14:00) (65 - 74)  BP: 120/65 (13 Aug 2021 14:00) (107/60 - 127/71)  RR: 16 (13 Aug 2021 14:00) (16 - 16)    PE:  chest: cta b/l  cardio: s1s2 heard  abd: BS soft , min ttp in lower quad, no RT    08-13    142  |  104  |  9<L>  ----------------------------<  75  4.2   |  25  |  0.7    Ca    9.1      13 Aug 2021 07:26  Phos  3.5     08-12  Mg     2.2     08-12    TPro  6.8  /  Alb  4.3  /  TBili  0.6  /  DBili  x   /  AST  19  /  ALT  18  /  AlkPhos  114  08-11                            12.9   4.77  )-----------( 303      ( 13 Aug 2021 07:26 )             39.7     CAPILLARY BLOOD GLUCOSE        CARDIAC MARKERS ( 11 Aug 2021 21:00 )  x     / <0.01 ng/mL / x     / x     / x            
S; Pt doing well, tolerated regular diet. No N/V - had soft BM this AM, denies abdom pain  O; Vital Signs Last 24 Hrs  T(C): 36.7 (14 Aug 2021 05:15), Max: 36.7 (14 Aug 2021 05:15)  T(F): 98.1 (14 Aug 2021 05:15), Max: 98.1 (14 Aug 2021 05:15)  HR: 70 (14 Aug 2021 05:15) (66 - 74)  BP: 119/67 (14 Aug 2021 05:15) (119/67 - 121/68)  BP(mean): --  RR: 17 (14 Aug 2021 05:15) (16 - 18)  SpO2: --    MEDICATIONS  (STANDING):  cefoTEtan  IVPB 1 Gram(s) IV Intermittent every 12 hours  chlorhexidine 4% Liquid 1 Application(s) Topical two times a day  famotidine Injectable 20 milliGRAM(s) IV Push two times a day  heparin   Injectable 5000 Unit(s) SubCutaneous every 12 hours  losartan 50 milliGRAM(s) Oral daily  metroNIDAZOLE  IVPB 500 milliGRAM(s) IV Intermittent every 8 hours  simvastatin 20 milliGRAM(s) Oral at bedtime    MEDICATIONS  (PRN):  ondansetron Injectable 4 milliGRAM(s) IV Push every 6 hours PRN Nausea and/or Vomiting    EXAM:  lungs: cta  cvs: s1s2  abd: soft, NT/ND    labs: PENDING

## 2021-08-14 NOTE — DISCHARGE NOTE PROVIDER - HOSPITAL COURSE
HPI:  56 y/o female with PMH of HTN, HLD, GERD presents to the ED with c/o 3 day hx abdominal pain associated with nausea/vomiting and chills. Pt states, pain started at the epigastrium but then radiated to the lower abdomen right>left. Pt sister at bedside states, pt also c/o chest pressure when pt was nauseous. Denies SOB, cough and palpitations.  (11 Aug 2021 23:39)    - Pt was placed on cefotetan & flagyl   - WBC = 11-5--4  - remained afebrile and pain improved   - tolerated regular diet HPI:  54 y/o female with PMH of HTN, HLD, GERD presents to the ED with c/o 3 day hx abdominal pain associated with nausea/vomiting and chills. Pt states, pain started at the epigastrium but then radiated to the lower abdomen right>left. Pt sister at bedside states, pt also c/o chest pressure when pt was nauseous. Denies SOB, cough and palpitations.  (11 Aug 2021 23:39)    - Pt was placed on cefotetan & flagyl   - WBC = 11-5--4  - remained afebrile and pain improved   - tolerated regular diet    Augmentin 875/125 once tab by mouth twice daily for 7 days.  Follow up with Dr. Bush Friday 8/20/21

## 2021-08-14 NOTE — PROGRESS NOTE ADULT - ASSESSMENT
56 y/o female with PMH of HTN, HLD, GERD admitted for tip appendicitis on CT  conservative management     A/P:    - cont reg diet  - cont cefotetan & flagyl  -F/U labs  -cw losartan for HTN  - cont w/simvastatin for HLD  -likely  d/c today w/PO abx

## 2021-08-16 PROBLEM — I10 ESSENTIAL (PRIMARY) HYPERTENSION: Chronic | Status: ACTIVE | Noted: 2021-08-11

## 2021-08-16 PROBLEM — E78.5 HYPERLIPIDEMIA, UNSPECIFIED: Chronic | Status: ACTIVE | Noted: 2021-08-11

## 2021-08-17 LAB
CULTURE RESULTS: SIGNIFICANT CHANGE UP
CULTURE RESULTS: SIGNIFICANT CHANGE UP
SPECIMEN SOURCE: SIGNIFICANT CHANGE UP
SPECIMEN SOURCE: SIGNIFICANT CHANGE UP

## 2021-08-18 DIAGNOSIS — K35.80 UNSPECIFIED ACUTE APPENDICITIS: ICD-10-CM

## 2021-08-18 DIAGNOSIS — E78.5 HYPERLIPIDEMIA, UNSPECIFIED: ICD-10-CM

## 2021-08-18 DIAGNOSIS — K21.9 GASTRO-ESOPHAGEAL REFLUX DISEASE WITHOUT ESOPHAGITIS: ICD-10-CM

## 2021-08-18 DIAGNOSIS — G89.29 OTHER CHRONIC PAIN: ICD-10-CM

## 2021-08-18 DIAGNOSIS — I10 ESSENTIAL (PRIMARY) HYPERTENSION: ICD-10-CM

## 2021-08-18 DIAGNOSIS — M54.9 DORSALGIA, UNSPECIFIED: ICD-10-CM

## 2021-08-20 ENCOUNTER — APPOINTMENT (OUTPATIENT)
Age: 56
End: 2021-08-20
Payer: MEDICAID

## 2021-08-20 VITALS
BODY MASS INDEX: 21.53 KG/M2 | HEART RATE: 90 BPM | TEMPERATURE: 96.5 F | WEIGHT: 117 LBS | OXYGEN SATURATION: 98 % | HEIGHT: 62 IN | DIASTOLIC BLOOD PRESSURE: 78 MMHG | SYSTOLIC BLOOD PRESSURE: 118 MMHG

## 2021-08-20 DIAGNOSIS — Z78.9 OTHER SPECIFIED HEALTH STATUS: ICD-10-CM

## 2021-08-20 PROBLEM — Z00.00 ENCOUNTER FOR PREVENTIVE HEALTH EXAMINATION: Status: ACTIVE | Noted: 2021-08-20

## 2021-08-20 PROCEDURE — 99214 OFFICE O/P EST MOD 30 MIN: CPT

## 2021-08-24 PROBLEM — Z78.9 CAFFEINE USE: Status: ACTIVE | Noted: 2021-08-24

## 2021-08-24 NOTE — PHYSICAL EXAM
[Normal Thyroid] : the thyroid was normal [Normal Breath Sounds] : Normal breath sounds [Normal Heart Sounds] : normal heart sounds [Normal Rate and Rhythm] : normal rate and rhythm [Alert] : alert [Oriented to Person] : oriented to person [Oriented to Place] : oriented to place [Oriented to Time] : oriented to time [Calm] : calm [JVD] : no jugular venous distention  [Abdominal Masses] : No abdominal masses [Enlarged] : not enlarged [Tender] : was nontender [de-identified] : Well appearing, in NAD [de-identified] : NCAT [de-identified] : Supple [de-identified] : NA [de-identified] : Soft; nondistended. Nontender to palpation. [de-identified] : NA [de-identified] : NA [de-identified] : FROM [de-identified] : Warm; dry [de-identified] : Cn 2-12 intact.

## 2021-08-24 NOTE — PLAN
[FreeTextEntry1] : - Cont abx and complete course; no further abx needed\par - Importance of screening colonoscopy was discussed; pt will coordinate with PCP in finding Gastroenterologist\par - Return to office in 8 weeks for followup\par \par I spent 30 minutes in the evaluation and management of this patient, all of which was a face to face encounter with care.

## 2021-08-24 NOTE — REASON FOR VISIT
[Follow-Up: _____] : a [unfilled] follow-up visit [FreeTextEntry1] : Pt here for  follow up on appendicitis issues on August 14

## 2021-08-24 NOTE — HISTORY OF PRESENT ILLNESS
[de-identified] : This is a 56yo woman with a history of chronic back pain, HTN, HLD. She was recently admitted to [facility] in the setting of abdominal pain, which she had been experiencing for 4 days prior. There was diffuse abdominal tenderness, not well localized to the RLQ. WBC ct was elevated on admission and CT scan was equivocal for acute appendicitis, with air in the mid to distal appendix; possible 'tip' appendicitis was present. The patient was admitted to the hospital and treated nonoperatively with IV abx. She responded completely and was discharged to home with course of Po abx x 1 week. She presents today for followup. [de-identified] : The patient has been doing well since discharge. She denies any ongoing, cont'd abdominal pain. She denies fevers or chills. She has had no problems with taking PO and describes normal, brown BM. She has cont'd to take her abx and has 1 day of therapy left.

## 2021-08-24 NOTE — ASSESSMENT
[FreeTextEntry1] : This is a 56yo woman with a history of abdominal pain and equivocal workup for appendicitis; treated medically, which appears to have been efficacious; no e/o ongoing appendicitis at present.

## 2021-10-01 ENCOUNTER — APPOINTMENT (OUTPATIENT)
Dept: SURGERY | Facility: CLINIC | Age: 56
End: 2021-10-01
Payer: MEDICAID

## 2021-10-01 VITALS
BODY MASS INDEX: 21.16 KG/M2 | OXYGEN SATURATION: 90 % | TEMPERATURE: 96.5 F | WEIGHT: 115 LBS | HEART RATE: 88 BPM | HEIGHT: 62 IN

## 2021-10-01 DIAGNOSIS — K35.80 UNSPECIFIED ACUTE APPENDICITIS: ICD-10-CM

## 2021-10-01 DIAGNOSIS — K21.00 GASTRO-ESOPHAGEAL REFLUX DISEASE WITH ESOPHAGITIS, WITHOUT BLEEDING: ICD-10-CM

## 2021-10-01 PROCEDURE — 99214 OFFICE O/P EST MOD 30 MIN: CPT

## 2021-10-01 NOTE — ASSESSMENT
[FreeTextEntry1] : This is a 54yo woman with a history of abdominal pain and equivocal workup for appendicitis; treated medically, which appears to have been efficacious. She has symptoms that are c/w GERD +/- IBS. There is no indication of untreated or ongoing appendicitis at present.

## 2021-10-01 NOTE — PLAN
[FreeTextEntry1] : - I recommend cont'd followup with PCP and gastroenterologist, PPI and lifestyle +/- dietary modification for ongoing GI complaints, which are reflux like in nature and chronic - all of them predated the patient's hospitalization and there is no RLQ tenderness today.\par - No further abx required\par - Return to office as needed; pt instructed to phone if RLQ pain recurs, worsens, or becomes a/w fever or other symptoms. At that point, I would recommend interval imaging with CT ap\par \par I spent 30 minutes in the evaluation and management of this patient, all of which was a face to face encounter with care.

## 2021-10-01 NOTE — REVIEW OF SYSTEMS
[As Noted in HPI] : as noted in HPI [Abdominal Pain] : abdominal pain [Heartburn] : heartburn [Negative] : Genitourinary [Fever] : no fever [Chills] : no chills [Feeling Poorly] : not feeling poorly [Feeling Tired] : not feeling tired [Recent Weight Loss (___ Lbs)] : no recent weight loss [Eye Pain] : no eye pain [Red Eyes] : eyes not red [Earache] : no earache [Loss Of Hearing] : no hearing loss [Heart Rate Is Slow] : the heart rate was not slow [Heart Rate Is Fast] : the heart rate was not fast [Chest Pain] : no chest pain [Palpitations] : no palpitations [Shortness Of Breath] : no shortness of breath [Wheezing] : no wheezing [Cough] : no cough [SOB on Exertion] : no shortness of breath during exertion [Vomiting] : no vomiting [Constipation] : no constipation [Diarrhea] : no diarrhea [Proptosis] : no proptosis [Hot Flashes] : no hot flashes [Muscle Weakness] : no muscle weakness

## 2021-10-01 NOTE — PHYSICAL EXAM
[Normal Thyroid] : the thyroid was normal [Normal Breath Sounds] : Normal breath sounds [Normal Heart Sounds] : normal heart sounds [Normal Rate and Rhythm] : normal rate and rhythm [Alert] : alert [Oriented to Person] : oriented to person [Oriented to Place] : oriented to place [Oriented to Time] : oriented to time [Calm] : calm [JVD] : no jugular venous distention  [Abdominal Masses] : No abdominal masses [Enlarged] : not enlarged [Tender] : was nontender [de-identified] : Well appearing, in NAD [de-identified] : NCAT [de-identified] : Supple [de-identified] : NA [de-identified] : Soft; nondistended. Nontender to palpation. [de-identified] : NA [de-identified] : NA [de-identified] : FROM [de-identified] : Warm; dry [de-identified] : Cn 2-12 intact.

## 2021-10-01 NOTE — HISTORY OF PRESENT ILLNESS
[de-identified] : This is a 54yo woman with a history of chronic back pain, HTN, HLD. She was recently admitted to [facility] in the setting of abdominal pain, which she had been experiencing for 4 days prior. There was diffuse abdominal tenderness, not well localized to the RLQ. WBC ct was elevated on admission and CT scan was equivocal for acute appendicitis, with air in the mid to distal appendix; possible 'tip' appendicitis was present. The patient was admitted to the hospital and treated nonoperatively with IV abx. She responded completely and was discharged to home with course of Po abx x 1 week. She was seen in the office in 8/2021 at which point she had been doing well; today she presents for followup. [de-identified] : The pt overall feels well. She describes some reflux type symptoms, with indigestion and heartburn that tend to occur following meals and are most prominent in the upper abdomen and chest. She does endorse some postprandial bloating and occasional pain in the RLQ and LLQ; these are not severe, and do not occur on a daily basis. These all occurred prior to August, and have been longstanding problems. She does not recall specific triggers, though eats plentiful raw cruciferous vegetables. She denies nausea or emesis; she has been having regular stools, which she describes as sometimes foul smelling and 'black.' She underwent an upper endoscopy in [place] a week ago but does not have results; she does have a history of H pylori in the past which was treated. She underwent colonoscopy a few years ago which she states had been normal. The pt denies fevers, chills, or recent unintentional wt loss.

## 2021-10-01 NOTE — REASON FOR VISIT
[Follow-Up: _____] : a [unfilled] follow-up visit [Spouse] : spouse [FreeTextEntry1] : Pt here for follow up appendicitis

## 2024-07-26 ENCOUNTER — EMERGENCY (EMERGENCY)
Facility: HOSPITAL | Age: 59
LOS: 0 days | Discharge: HOME | End: 2024-07-26
Attending: EMERGENCY MEDICINE
Payer: MEDICAID

## 2024-07-26 VITALS
DIASTOLIC BLOOD PRESSURE: 88 MMHG | WEIGHT: 115.08 LBS | TEMPERATURE: 98 F | OXYGEN SATURATION: 96 % | RESPIRATION RATE: 19 BRPM | HEART RATE: 97 BPM | SYSTOLIC BLOOD PRESSURE: 137 MMHG

## 2024-07-26 DIAGNOSIS — I10 ESSENTIAL (PRIMARY) HYPERTENSION: ICD-10-CM

## 2024-07-26 DIAGNOSIS — R42 DIZZINESS AND GIDDINESS: ICD-10-CM
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
## 2024-07-26 DIAGNOSIS — E78.5 HYPERLIPIDEMIA, UNSPECIFIED: ICD-10-CM
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
## 2024-07-26 DIAGNOSIS — H10.029 OTHER MUCOPURULENT CONJUNCTIVITIS, UNSPECIFIED EYE: ICD-10-CM

## 2024-07-26 DIAGNOSIS — M81.0 AGE-RELATED OSTEOPOROSIS WITHOUT CURRENT PATHOLOGICAL FRACTURE: ICD-10-CM

## 2024-07-26 LAB
ALBUMIN SERPL ELPH-MCNC: 5 G/DL — SIGNIFICANT CHANGE UP (ref 3.5–5.2)
ALP SERPL-CCNC: 80 U/L — SIGNIFICANT CHANGE UP (ref 30–115)
ALT FLD-CCNC: 22 U/L — SIGNIFICANT CHANGE UP (ref 0–41)
ANION GAP SERPL CALC-SCNC: 13 MMOL/L — SIGNIFICANT CHANGE UP (ref 7–14)
AST SERPL-CCNC: 21 U/L — SIGNIFICANT CHANGE UP (ref 0–41)
BASOPHILS # BLD AUTO: 0.05 K/UL — SIGNIFICANT CHANGE UP (ref 0–0.2)
BASOPHILS NFR BLD AUTO: 0.9 % — SIGNIFICANT CHANGE UP (ref 0–1)
BILIRUB SERPL-MCNC: 0.5 MG/DL — SIGNIFICANT CHANGE UP (ref 0.2–1.2)
BUN SERPL-MCNC: 16 MG/DL — SIGNIFICANT CHANGE UP (ref 10–20)
CALCIUM SERPL-MCNC: 10.1 MG/DL — SIGNIFICANT CHANGE UP (ref 8.4–10.5)
CHLORIDE SERPL-SCNC: 97 MMOL/L — LOW (ref 98–110)
CO2 SERPL-SCNC: 28 MMOL/L — SIGNIFICANT CHANGE UP (ref 17–32)
CREAT SERPL-MCNC: 0.7 MG/DL — SIGNIFICANT CHANGE UP (ref 0.7–1.5)
EGFR: 100 ML/MIN/1.73M2 — SIGNIFICANT CHANGE UP
EOSINOPHIL # BLD AUTO: 0.1 K/UL — SIGNIFICANT CHANGE UP (ref 0–0.7)
EOSINOPHIL NFR BLD AUTO: 1.8 % — SIGNIFICANT CHANGE UP (ref 0–8)
GLUCOSE BLDC GLUCOMTR-MCNC: 166 MG/DL — HIGH (ref 70–99)
GLUCOSE SERPL-MCNC: 152 MG/DL — HIGH (ref 70–99)
HCT VFR BLD CALC: 45.2 % — SIGNIFICANT CHANGE UP (ref 37–47)
HGB BLD-MCNC: 15.1 G/DL — SIGNIFICANT CHANGE UP (ref 12–16)
IMM GRANULOCYTES NFR BLD AUTO: 0.2 % — SIGNIFICANT CHANGE UP (ref 0.1–0.3)
LYMPHOCYTES # BLD AUTO: 2.5 K/UL — SIGNIFICANT CHANGE UP (ref 1.2–3.4)
LYMPHOCYTES # BLD AUTO: 45.2 % — SIGNIFICANT CHANGE UP (ref 20.5–51.1)
MCHC RBC-ENTMCNC: 28.9 PG — SIGNIFICANT CHANGE UP (ref 27–31)
MCHC RBC-ENTMCNC: 33.4 G/DL — SIGNIFICANT CHANGE UP (ref 32–37)
MCV RBC AUTO: 86.6 FL — SIGNIFICANT CHANGE UP (ref 81–99)
MONOCYTES # BLD AUTO: 0.38 K/UL — SIGNIFICANT CHANGE UP (ref 0.1–0.6)
MONOCYTES NFR BLD AUTO: 6.9 % — SIGNIFICANT CHANGE UP (ref 1.7–9.3)
NEUTROPHILS # BLD AUTO: 2.49 K/UL — SIGNIFICANT CHANGE UP (ref 1.4–6.5)
NEUTROPHILS NFR BLD AUTO: 45 % — SIGNIFICANT CHANGE UP (ref 42.2–75.2)
NRBC # BLD: 0 /100 WBCS — SIGNIFICANT CHANGE UP (ref 0–0)
PLATELET # BLD AUTO: 338 K/UL — SIGNIFICANT CHANGE UP (ref 130–400)
PMV BLD: 9.4 FL — SIGNIFICANT CHANGE UP (ref 7.4–10.4)
POTASSIUM SERPL-MCNC: 3.9 MMOL/L — SIGNIFICANT CHANGE UP (ref 3.5–5)
POTASSIUM SERPL-SCNC: 3.9 MMOL/L — SIGNIFICANT CHANGE UP (ref 3.5–5)
PROT SERPL-MCNC: 7.6 G/DL — SIGNIFICANT CHANGE UP (ref 6–8)
RBC # BLD: 5.22 M/UL — SIGNIFICANT CHANGE UP (ref 4.2–5.4)
RBC # FLD: 11.8 % — SIGNIFICANT CHANGE UP (ref 11.5–14.5)
SODIUM SERPL-SCNC: 138 MMOL/L — SIGNIFICANT CHANGE UP (ref 135–146)
WBC # BLD: 5.53 K/UL — SIGNIFICANT CHANGE UP (ref 4.8–10.8)
WBC # FLD AUTO: 5.53 K/UL — SIGNIFICANT CHANGE UP (ref 4.8–10.8)

## 2024-07-26 PROCEDURE — 85025 COMPLETE CBC W/AUTO DIFF WBC: CPT

## 2024-07-26 PROCEDURE — 73562 X-RAY EXAM OF KNEE 3: CPT | Mod: 26,LT

## 2024-07-26 PROCEDURE — 99284 EMERGENCY DEPT VISIT MOD MDM: CPT

## 2024-07-26 PROCEDURE — 73562 X-RAY EXAM OF KNEE 3: CPT | Mod: LT

## 2024-07-26 PROCEDURE — 99285 EMERGENCY DEPT VISIT HI MDM: CPT | Mod: 25

## 2024-07-26 PROCEDURE — 93005 ELECTROCARDIOGRAM TRACING: CPT

## 2024-07-26 PROCEDURE — 36415 COLL VENOUS BLD VENIPUNCTURE: CPT

## 2024-07-26 PROCEDURE — 93010 ELECTROCARDIOGRAM REPORT: CPT

## 2024-07-26 PROCEDURE — 82962 GLUCOSE BLOOD TEST: CPT

## 2024-07-26 PROCEDURE — 80053 COMPREHEN METABOLIC PANEL: CPT

## 2024-07-26 PROCEDURE — 36000 PLACE NEEDLE IN VEIN: CPT

## 2024-07-26 RX ORDER — DEXTROSE MONOHYDRATE AND SODIUM CHLORIDE 5; .3 G/100ML; G/100ML
1000 INJECTION, SOLUTION INTRAVENOUS ONCE
Refills: 0 | Status: COMPLETED | OUTPATIENT
Start: 2024-07-26 | End: 2024-07-26

## 2024-07-26 RX ADMIN — DEXTROSE MONOHYDRATE AND SODIUM CHLORIDE 1000 MILLILITER(S): 5; .3 INJECTION, SOLUTION INTRAVENOUS at 11:58

## 2024-07-26 RX ADMIN — Medication 600 MILLIGRAM(S): at 11:06

## 2024-07-26 NOTE — ED PROVIDER NOTE - NSFOLLOWUPINSTRUCTIONS_ED_ALL_ED_FT
Fall Prevention in the Home, Adult  Falls can cause injuries and can happen to people of all ages. There are many things you can do to make your home safer and to help prevent falls.    What actions can I take to prevent falls?  General information    Use good lighting in all rooms. Make sure to:  Replace any light bulbs that burn out.  Turn on the lights in dark areas and use night-lights.  Keep items that you use often in easy-to-reach places. Lower the shelves around your home if needed.  Move furniture so that there are clear paths around it.  Do not use throw rugs or other things on the floor that can make you trip.  If any of your floors are uneven, fix them.  Add color or contrast paint or tape to clearly porfirio and help you see:  Grab bars or handrails.  First and last steps of staircases.  Where the edge of each step is.  If you use a ladder or stepladder:  Make sure that it is fully opened. Do not climb a closed ladder.  Make sure the sides of the ladder are locked in place.  Have someone hold the ladder while you use it.  Know where your pets are as you move through your home.  What can I do in the bathroom?    A grab bar next to a toilet.  Shower and bathtub, showing safety grab bars on the walls.  Keep the floor dry. Clean up any water on the floor right away.  Remove soap buildup in the bathtub or shower. Buildup makes bathtubs and showers slippery.  Use non-skid mats or decals on the floor of the bathtub or shower.  Attach bath mats securely with double-sided, non-slip rug tape.  If you need to sit down in the shower, use a non-slip stool.  Install grab bars by the toilet and in the bathtub and shower. Do not use towel bars as grab bars.  What can I do in the bedroom?    Make sure that you have a light by your bed that is easy to reach.  Do not use any sheets or blankets on your bed that hang to the floor.  Have a firm chair or bench with side arms that you can use for support when you get dressed.  What can I do in the kitchen?    Clean up any spills right away.  If you need to reach something above you, use a step stool with a grab bar.  Keep electrical cords out of the way.  Do not use floor polish or wax that makes floors slippery.  What can I do with my stairs?    Do not leave anything on the stairs.  Make sure that you have a light switch at the top and the bottom of the stairs.  Make sure that there are handrails on both sides of the stairs. Fix handrails that are broken or loose.  Install non-slip stair treads on all your stairs if they do not have carpet.  Avoid having throw rugs at the top or bottom of the stairs.  Choose a carpet that does not hide the edge of the steps on the stairs. Make sure that the carpet is firmly attached to the stairs. Fix carpet that is loose or worn.  What can I do on the outside of my home?    Use bright outdoor lighting.  Fix the edges of walkways and driveways and fix any cracks. Clear paths of anything that can make you trip, such as tools or rocks.  Add color or contrast paint or tape to clearly porfirio and help you see anything that might make you trip as you walk through a door, such as a raised step or threshold.  Trim any bushes or trees on paths to your home.  Check to see if handrails are loose or broken and that both sides of all steps have handrails. Install guardrails along the edges of any raised decks and porches.  Have leaves, snow, or ice cleared regularly. Use sand, salt, or ice melter on paths if you live where there is ice and snow during the winter.  Clean up any spills in your garage right away. This includes grease or oil spills.  What other actions can I take?    Review your medicines with your doctor. Some medicines can cause dizziness or changes in blood pressure, which increase your risk of falling.  Wear shoes that:  Have a low heel. Do not wear high heels.  Have rubber bottoms and are closed at the toe.  Feel good on your feet and fit well.  Use tools that help you move around if needed. These include:  Canes.  Walkers.  Scooters.  Crutches.  Ask your doctor what else you can do to help prevent falls. This may include seeing a physical therapist to learn to do exercises to move better and get stronger.  Where to find more information  Centers for Disease Control and Prevention, [employer]: cdc.gov  National [place] [employer] Aging: khadijah.nih.gov  National [place] [employer]ging: khadijah.nih.gov  Contact a doctor if:  You are afraid of falling at home.  You feel weak, drowsy, or dizzy at home.  You fall at home.  Get help right away if you:  Lose consciousness or have trouble moving after a fall.  Have a fall that causes a head injury.  These symptoms may be an emergency. Get help right away. Call 911.  Do not wait to see if the symptoms will go away.  Do not drive yourself to the hospital.  This information is not intended to replace advice given to you by your health care provider. Make sure you discuss any questions you have with your health care provider.

## 2024-07-26 NOTE — ED PROVIDER NOTE - OBJECTIVE STATEMENT
58-year-old female with past ministry of osteoporosis, lipidemia, hypertension presenting after a fall.  Patient reports she was walking when she felt lightheaded causing her to fall onto her knees.  Patient denies any head trauma or LOC.  Patient was able to get up and walk immediately after and reports lightheadedness which is transient.  Patient denies ever feeling this way in the past.  Patient has been taking care of her  who has been in the hospital and reports eating breakfast with him this morning.  Patient denies any headache, dizziness or lightness at this time.  No fever, chills, nausea, vomiting, chest pain or shortness of breath no abdominal pain,  symptoms.

## 2024-07-26 NOTE — ED PROVIDER NOTE - PRO INTERPRETER NEED 2
English Bactrim Pregnancy And Lactation Text: This medication is Pregnancy Category D and is known to cause fetal risk.  It is also excreted in breast milk.

## 2024-07-26 NOTE — ED PROVIDER NOTE - CLINICAL SUMMARY MEDICAL DECISION MAKING FREE TEXT BOX
pt fell onto knee after feeling lightheaded, no cp, no palp, no sob, no h/a, this happens from time to time, on exam vital signs appreciated, well appearing, head nc/at, perrla, EOMI, conj pink op clear neck supple cor rrr lungs cta abd snt no c/c/e pulses equal calves nontender neuro intact, FROM to knee with minima, ttp, joint stable extensor mechanism intact NVI distally, labs and studies reviewed, will d/c to f/u with pmd. Patient counseled regarding conditions which should prompt return.

## 2024-07-26 NOTE — ED PROVIDER NOTE - PHYSICAL EXAMINATION
Constitutional: Well developed, well nourished, no acute distress  Head: Normocephalic, Atraumatic  Eyes: PERRLA, EOMI, conjunctiva and sclera WNL  ENT: Moist mucous membranes, no rhinorrhea,     Neck: Supple, Nontender,    Respiratory: Normal chest excursion with respiration; Breath sounds clear and equal B/L; No wheezes, rales, or rhonchi   Cardiovascular: RRR; Normal S1, S2; No murmurs, rubs or gallops   ABD/GI:   Nondistended; Nontender; No guarding, rigidity or rebound;   EXT/MS: Moving all extremities; Distal pulses 2+ B/L; No peripheral edema  Skin: Normal for age and race; Warm and dry; Abrasion to the left knee  Neurologic: AAO x 4; CN 2-12 intact; Normal motor and sensory function  Psychiatric: Appropriate affect, normal mood

## 2024-07-26 NOTE — ED ADULT TRIAGE NOTE - CHIEF COMPLAINT QUOTE
pt states she was visiting her  . got up became light headed  started to walk and fell. Pt states this happened before couple times. Fady FELICIANO assessed pt no code stroke.

## 2024-07-26 NOTE — ED PROVIDER NOTE - PATIENT PORTAL LINK FT
You can access the FollowMyHealth Patient Portal offered by [facility] by registering at the following website: [email or web address]. By joining [facility] FollowMyHealth portal, you will also be able to view your health information using other applications (apps) compatible with our system.

## 2024-07-26 NOTE — ED ADULT NURSE NOTE - NSFALLUNIVINTERV_ED_ALL_ED
Bed/Stretcher in lowest position, wheels locked, appropriate side rails in place/Call bell, personal items and telephone in reach/Instruct patient to call for assistance before getting out of bed/chair/stretcher/Non-slip footwear applied when patient is off stretcher/[place] to call system/Physically safe environment - no spills, clutter or unnecessary equipment/Purposeful proactive rounding/Room/bathroom lighting operational, light cord in reach